# Patient Record
Sex: MALE | Race: WHITE | NOT HISPANIC OR LATINO | Employment: OTHER | URBAN - METROPOLITAN AREA
[De-identification: names, ages, dates, MRNs, and addresses within clinical notes are randomized per-mention and may not be internally consistent; named-entity substitution may affect disease eponyms.]

---

## 2023-06-05 ENCOUNTER — OFFICE VISIT (OUTPATIENT)
Dept: PRIMARY CARE | Facility: CLINIC | Age: 58
End: 2023-06-05
Payer: COMMERCIAL

## 2023-06-05 VITALS
SYSTOLIC BLOOD PRESSURE: 104 MMHG | HEART RATE: 55 BPM | TEMPERATURE: 97.3 F | BODY MASS INDEX: 41.03 KG/M2 | WEIGHT: 209 LBS | DIASTOLIC BLOOD PRESSURE: 70 MMHG | HEIGHT: 60 IN

## 2023-06-05 DIAGNOSIS — M79.10 MUSCLE PAIN: Primary | ICD-10-CM

## 2023-06-05 PROCEDURE — 1036F TOBACCO NON-USER: CPT | Performed by: FAMILY MEDICINE

## 2023-06-05 ASSESSMENT — PATIENT HEALTH QUESTIONNAIRE - PHQ9
1. LITTLE INTEREST OR PLEASURE IN DOING THINGS: NOT AT ALL
SUM OF ALL RESPONSES TO PHQ9 QUESTIONS 1 AND 2: 0
2. FEELING DOWN, DEPRESSED OR HOPELESS: NOT AT ALL

## 2023-12-18 NOTE — PROGRESS NOTES
"Subjective   Patient ID: Grayson Zaragoza is a 58 y.o. male who presents for Muscle Pain (Lower back pain and hip pain on the right side for the last moved.).  HPI  Patient left without beinbg seen  Review of Systems    Objective    /70   Pulse 55   Temp 36.3 °C (97.3 °F)   Ht (!) 0.152 m (6\")   Wt 94.8 kg (209 lb)   BMI 4081.75 kg/m²    Physical Exam    Assessment/Plan   Problem List Items Addressed This Visit    None    No charge visit       Sherry Schwartz MD  "

## 2024-05-09 ENCOUNTER — OFFICE VISIT (OUTPATIENT)
Dept: SURGERY | Facility: CLINIC | Age: 59
End: 2024-05-09
Payer: COMMERCIAL

## 2024-05-09 VITALS
WEIGHT: 209 LBS | OXYGEN SATURATION: 95 % | HEIGHT: 72 IN | BODY MASS INDEX: 28.31 KG/M2 | HEART RATE: 67 BPM | DIASTOLIC BLOOD PRESSURE: 68 MMHG | RESPIRATION RATE: 16 BRPM | SYSTOLIC BLOOD PRESSURE: 110 MMHG | TEMPERATURE: 97.5 F

## 2024-05-09 DIAGNOSIS — K40.90 LEFT INGUINAL HERNIA: Primary | ICD-10-CM

## 2024-05-09 PROCEDURE — 99213 OFFICE O/P EST LOW 20 MIN: CPT | Performed by: SURGERY

## 2024-05-09 PROCEDURE — 1036F TOBACCO NON-USER: CPT | Performed by: SURGERY

## 2024-05-09 RX ORDER — SERTRALINE HYDROCHLORIDE 50 MG/1
50 TABLET, FILM COATED ORAL DAILY
COMMUNITY
Start: 2023-08-31

## 2024-05-09 NOTE — PROGRESS NOTES
Subjective   Patient ID: Grayson Zaragoza is a 58 y.o. male who presents for Hernia ([facility] ).    HPI complaints and the palpable lump in the left inguinal area  Review of Systems integumentary consistent with a palpable lump in the left inguinal area, pain.  Review of other 10 system is negative  Physical Exam pupils equal bilaterally, mucosa moist, bilateral breath sounds, clear to auscultation, regular rhythm and rate, no murmurs, abdomen soft, mild tenderness left inguinal area.  Palpable reducible left inguinal hernia.  No other hernias.  Palpable peripheral pulse, no focal neurological motor deficits.    Objective I reviewed all available data including lab results, radiological studies, previous reports and notes.  Last colonoscopy 2022 was within normal limits.  No pathology except right renal hernia was found during the robotic right renal hernia repair.    No diagnosis found.   There is no problem list on file for this patient.     No Known Allergies   Medication Documentation Review Audit       Reviewed by Jacques Rao MD (Physician) on 05/09/24 at 0859      Medication Order Taking? Sig Documenting Provider Last Dose Status   sertraline (Zoloft) 50 mg tablet [id number] Yes Take 1 tablet (50 mg) by mouth once daily. Historical Provider, MD Taking Active                    History reviewed. No pertinent past medical history.  Social History     Tobacco Use   Smoking Status Never   Smokeless Tobacco Never     No family history on file.   Past Surgical History:   Procedure Laterality Date    COLONOSCOPY  11/01/2022    Complete Colonoscopy    KNEE ARTHROSCOPY W/ MENISCAL REPAIR  09/18/2015    Knee Arthroscopy With Medial Meniscus Repair       Assessment/Plan   The patient was symptomatic left inguinal hernia.  The patient has indication for robotic, possible open symptomatic left inguinal hernia repair.  Risks, benefits, alternative treatment were explained to the patient.  All questions were answered.  Informed  consent was obtained.      Jacques Rao MD

## 2024-05-16 ENCOUNTER — APPOINTMENT (OUTPATIENT)
Dept: SURGERY | Facility: CLINIC | Age: 59
End: 2024-05-16
Payer: COMMERCIAL